# Patient Record
(demographics unavailable — no encounter records)

---

## 2024-12-17 NOTE — ASSESSMENT
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 Continue MMI for now, we may try to lower the dose vs switch to PTU if bother by the anorgasmia. Discussed definitive therapy as well. Will continue MMI 5 mg for now   Counseled on the risk of agranulocytosis and increased LFT. Advised to let us know in if any unusual thing happening    Thyroid nodule  6/2024 Thyroid US shows 2 thyroid nodules: Left thyroid nodule 0.6, right thyroid nodule 0.5 both, not need to be biopsied. Can repeat US annually Repeat 6/2025  Has history of massive heart attack in the past with cardiac arrest Aware about monitoring heart rate regularly  Has ROBINSON irizarry   Sleep apnea  Follow up with PCP   Obesity  Weight loss and exercise   I spent 30 minutes discussing with patient face to face and non face to face reviewing documentations, labs, and/or imaging, also discussing the management plans.   RTC in 3 months for 30 minutes

## 2024-12-17 NOTE — PHYSICAL EXAM
[Alert] : alert [Well Nourished] : well nourished [No Neck Mass] : no neck mass was observed [No LAD] : no lymphadenopathy [Thyroid Not Enlarged] : the thyroid was not enlarged [No Respiratory Distress] : no respiratory distress [No Accessory Muscle Use] : no accessory muscle use [Clear to Auscultation] : lungs were clear to auscultation bilaterally [Normal S1, S2] : normal S1 and S2 [de-identified] : Right corneal opacity

## 2024-12-17 NOTE — PHYSICAL EXAM
[Alert] : alert [Well Nourished] : well nourished [No Neck Mass] : no neck mass was observed [No LAD] : no lymphadenopathy [Thyroid Not Enlarged] : the thyroid was not enlarged [No Respiratory Distress] : no respiratory distress [No Accessory Muscle Use] : no accessory muscle use [Clear to Auscultation] : lungs were clear to auscultation bilaterally [Normal S1, S2] : normal S1 and S2 [de-identified] : Right corneal opacity

## 2024-12-17 NOTE — HISTORY OF PRESENT ILLNESS
[FreeTextEntry1] : Follow up for hyperthyroidism    Case history: Was diagnosed with hypothyroidism in 2022 was placed on LT4, then stopped  TSI Positive  Has anorgasmia, weight gain Back on MMI 5 daily   Denied heat intolerance, palpitation, feeling anxious Sometimes report diarrhe      TSI, TRAB positive    Labs 523 TSH 0.2 8/23 TSH <0.01, T3 Uptake 36, total T4 9.5, FT4 index 3.4 11/23 TSH <0.01, T4 9.5, T3 uptake 35, FT4 index 3.3 12/23 TSH <0.01, FT 1.7, TSI positive, TRAB positive       9/2024 CBC normal TSH 0.07 (low), FT4 1.5 (normal) LDL acceptable A1c 5 good TRAB is still positive TSI mildly positive at 0.7   9/22 Thyroid ultrasound shows subcentemeter hyperechoic nodule   All other ROS reviewed, and they are negative. Labs reviewed.

## 2025-03-19 NOTE — ASSESSMENT
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 Advised to let us know in if any unusual thing happening    Thyroid nodule  6/2024 Thyroid US shows 2 thyroid nodules: Left thyroid nodule 0.6, right thyroid nodule 0.5 both, not need to be biopsied. Can repeat US annually Repeat 6/2025  Has history of massive heart attack in the past with cardiac arrest Aware about monitoring heart rate regularly  Has D fib   Sleep apnea  Follow up with PCP   Obesity  Prediabetes  Weight loss and exercise   I spent 30 minutes discussing with patient face to face and non face to face reviewing documentations, labs, and/or imaging, also discussing the management plans.   RTC in 3 months for 30 minutes

## 2025-03-19 NOTE — PHYSICAL EXAM
[Alert] : alert [Well Nourished] : well nourished [No Neck Mass] : no neck mass was observed [No LAD] : no lymphadenopathy [Thyroid Not Enlarged] : the thyroid was not enlarged [No Respiratory Distress] : no respiratory distress [No Accessory Muscle Use] : no accessory muscle use [Clear to Auscultation] : lungs were clear to auscultation bilaterally [Normal S1, S2] : normal S1 and S2 [de-identified] : Right corneal opacity

## 2025-03-19 NOTE — HISTORY OF PRESENT ILLNESS
[FreeTextEntry1] : Follow up for hyperthyroidism    Case history: Was diagnosed with hypothyroidism in 2022 was placed on LT4, then stopped  TSI Positive   Has anorgasmia, weight gain, this has improved with small dose   Currently  Back on MMI 2.5 daily  TSI still positive, that is why we are continuing MMI   Denied heat intolerance, palpitation, feeling anxious      TSI, TRAB positive    Labs 523 TSH 0.2 8/23 TSH <0.01, T3 Uptake 36, total T4 9.5, FT4 index 3.4 11/23 TSH <0.01, T4 9.5, T3 uptake 35, FT4 index 3.3 12/23 TSH <0.01, FT 1.7, TSI positive, TRAB positive       9/2024 CBC normal TSH 0.07 (low), FT4 1.5 (normal) LDL acceptable A1c 5 good TRAB is still positive TSI mildly positive at 0.7  1/2025 TSH normal, F4 Normal and the rest of thyroid numbers except for TSI TSI is high means graves is still active, so continue on MMI for now   9/22 Thyroid ultrasound shows subcentemeter hyperechoic nodule   6/2024 Thyroid US shows 2 thyroid nodules: Left thyroid nodule 0.6, right thyroid nodule 0.5 both, not need to be biopsied. Can repeat US annually Repeat 6/2025  All other ROS reviewed, and they are negative. Labs reviewed.